# Patient Record
Sex: FEMALE | Race: WHITE | NOT HISPANIC OR LATINO | URBAN - METROPOLITAN AREA
[De-identification: names, ages, dates, MRNs, and addresses within clinical notes are randomized per-mention and may not be internally consistent; named-entity substitution may affect disease eponyms.]

---

## 2021-05-16 ENCOUNTER — EMERGENCY (EMERGENCY)
Facility: HOSPITAL | Age: 46
LOS: 1 days | Discharge: HOME | End: 2021-05-16
Attending: EMERGENCY MEDICINE | Admitting: EMERGENCY MEDICINE
Payer: COMMERCIAL

## 2021-05-16 VITALS
RESPIRATION RATE: 16 BRPM | DIASTOLIC BLOOD PRESSURE: 68 MMHG | HEART RATE: 67 BPM | SYSTOLIC BLOOD PRESSURE: 105 MMHG | OXYGEN SATURATION: 100 %

## 2021-05-16 VITALS
SYSTOLIC BLOOD PRESSURE: 109 MMHG | WEIGHT: 139.99 LBS | OXYGEN SATURATION: 100 % | HEART RATE: 77 BPM | HEIGHT: 65 IN | RESPIRATION RATE: 19 BRPM | TEMPERATURE: 98 F | DIASTOLIC BLOOD PRESSURE: 75 MMHG

## 2021-05-16 DIAGNOSIS — M25.511 PAIN IN RIGHT SHOULDER: ICD-10-CM

## 2021-05-16 PROCEDURE — 73030 X-RAY EXAM OF SHOULDER: CPT | Mod: 26

## 2021-05-16 PROCEDURE — 99284 EMERGENCY DEPT VISIT MOD MDM: CPT | Mod: 25

## 2021-05-16 PROCEDURE — 72125 CT NECK SPINE W/O DYE: CPT

## 2021-05-16 PROCEDURE — 72125 CT NECK SPINE W/O DYE: CPT | Mod: 26,MC

## 2021-05-16 PROCEDURE — 73030 X-RAY EXAM OF SHOULDER: CPT

## 2021-05-16 PROCEDURE — 96372 THER/PROPH/DIAG INJ SC/IM: CPT

## 2021-05-16 PROCEDURE — 73030 X-RAY EXAM OF SHOULDER: CPT | Mod: 26,RT

## 2021-05-16 RX ORDER — LIDOCAINE 4 G/100G
1 CREAM TOPICAL
Qty: 7 | Refills: 0
Start: 2021-05-16 | End: 2021-05-22

## 2021-05-16 RX ORDER — OXYCODONE AND ACETAMINOPHEN 5; 325 MG/1; MG/1
1 TABLET ORAL ONCE
Refills: 0 | Status: DISCONTINUED | OUTPATIENT
Start: 2021-05-16 | End: 2021-05-16

## 2021-05-16 RX ORDER — LIDOCAINE 4 G/100G
1 CREAM TOPICAL ONCE
Refills: 0 | Status: COMPLETED | OUTPATIENT
Start: 2021-05-16 | End: 2021-05-16

## 2021-05-16 RX ORDER — KETOROLAC TROMETHAMINE 30 MG/ML
30 SYRINGE (ML) INJECTION ONCE
Refills: 0 | Status: DISCONTINUED | OUTPATIENT
Start: 2021-05-16 | End: 2021-05-16

## 2021-05-16 RX ADMIN — OXYCODONE AND ACETAMINOPHEN 1 TABLET(S): 5; 325 TABLET ORAL at 02:37

## 2021-05-16 RX ADMIN — OXYCODONE AND ACETAMINOPHEN 1 TABLET(S): 5; 325 TABLET ORAL at 02:10

## 2021-05-16 RX ADMIN — LIDOCAINE 1 PATCH: 4 CREAM TOPICAL at 02:38

## 2021-05-16 RX ADMIN — Medication 30 MILLIGRAM(S): at 01:30

## 2021-05-16 NOTE — ED ADULT NURSE NOTE - OBJECTIVE STATEMENT
Pt c/o right shoulder pain which started today while getting dressed. Pt denies trauma to right shoulder.

## 2021-05-16 NOTE — ED PROVIDER NOTE - CARE PROVIDER_API CALL
Delon Graves)  Orthopedics  [address] [facility], [place] [address]  Phone: [phone]  Fax: [phone]  Follow Up Time:

## 2021-05-16 NOTE — ED PROVIDER NOTE - NSFOLLOWUPINSTRUCTIONS_ED_ALL_ED_FT
Shoulder Pain    WHAT YOU NEED TO KNOW:    What do I need to know about shoulder pain? Shoulder pain is a common problem that can affect your daily activities. Pain can be caused by a problem within your shoulder, such as soreness of a tendon or bursa. A tendon is a cord of tough tissue that connects your muscles to your bones. The bursa is a fluid-filled sac that acts as a cushion between a bone and a tendon. Shoulder pain may also be caused by pain that spreads to your shoulder from another part of your body.    Shoulder Anatomy         What increases my risk for shoulder pain?   •Repeated overhead activities, such as baseball, weight lifting, or swimming      •Medical conditions, such as rotator cuff disease, diabetes, or thyroid disorders      •A quick increase in the amount or intensity of exercises, or improper technique during exercise      •Muscle imbalance or weakness      •Trauma or a fall      •Age older than 60      How is shoulder pain diagnosed? Your healthcare provider will ask about your pain, including how and when it started. Tell him or her if you have any weakness or if there was an injury. He or she will examine your shoulder and do tests to see how well you can move your shoulder. You may also need any of the following tests:   •An x-ray, ultrasound, CT, or MRI may be needed to show the cause of your shoulder pain. You may be given contrast liquid to help the shoulder area show up better in the pictures. Tell the healthcare provider if you have ever had an allergic reaction to contrast liquid. Do not enter the MRI room with anything metal. Metal can cause serious injury. Tell the healthcare provider if you have any metal in or on your body.      •An electromyography test measures the electrical activity of your muscles at rest and with movement.      How is shoulder pain treated?   •Acetaminophen decreases pain and fever. It is available without a doctor's order. Ask how much to take and how often to take it. Follow directions. Read the labels of all other medicines you are using to see if they also contain acetaminophen, or ask your doctor or pharmacist. Acetaminophen can cause liver damage if not taken correctly. Do not use more than 4 grams (4,000 milligrams) total of acetaminophen in one day.       •NSAIDs, such as ibuprofen, help decrease swelling, pain, and fever. This medicine is available with or without a doctor's order. NSAIDs can cause stomach bleeding or kidney problems in certain people. If you take blood thinner medicine, always ask your healthcare provider if NSAIDs are safe for you. Always read the medicine label and follow directions.      •A steroid injection may help decrease pain and swelling.      •Surgery may be needed for long-term pain and loss of function.      How can I manage my symptoms?   •Apply ice on your shoulder for 20 to 30 minutes every 2 hours or as directed. Use an ice pack, or put crushed ice in a plastic bag. Cover it with a towel before you apply it to your shoulder. Ice helps prevent tissue damage and decreases swelling and pain.      •Apply heat if ice does not help your symptoms. Apply heat on your shoulder for 20 to 30 minutes every 2 hours for as many days as directed. Heat helps decrease pain and muscle spasms.      •Limit activities as directed. Try to avoid repeated overhead movements.      •Go to physical or occupational therapy as directed. A physical therapist teaches you exercises to help improve movement and strength, and to decrease pain. An occupational therapist teaches you skills to help with your daily activities.       How can I help prevent shoulder pain?   •Maintain a good range of motion in your shoulder. Ask your healthcare provider which exercises you should do on a regular basis after you have healed.       •Stretch and strengthen your shoulder. Use proper technique during exercises and sports.      When should I seek immediate care?   •You have severe pain.      •You cannot move your arm or shoulder.      •You have numbness or tingling in your shoulder or arm.      When should I contact my healthcare provider?   •Your pain gets worse or does not go away with treatment.      •You have trouble moving your arm or shoulder.      •You have questions or concerns about your condition or care.      CARE AGREEMENT:    You have the right to help plan your care. Learn about your health condition and how it may be treated. Discuss treatment options with your healthcare providers to decide what care you want to receive. You always have the right to refuse treatment.        © Copyright [employer] 2021         How to Use a Sling    AMBULATORY CARE:    A sling is a strong fabric loop that hangs from your neck to support your arm. Your arm, bent at the elbow, rests in the sling. Some slings have a strap that goes down your back to take the weight off your neck. This strap is connected to the elbow side of the sling. Your healthcare provider will help you decide which sling is best for you and where you can get one.     Shoulder Sling         Why you might need a sling: You may need a sling because of an injury or surgery to your hand, wrist, arm, or shoulder. A sling helps prevent your hand, arm, and shoulder from moving so your injury can heal. A sling can also help if you have a heavy cast on your arm.    Call your doctor or orthopedist if:   •Your arm, hand, or fingers become swollen, numb, painful, or pale.      •You have questions about how to use the sling.      How to use a sling: Your healthcare provider will teach you how to put on your sling. The following are general guidelines to help you remember what your provider teaches you:   •Gently bend your injured arm and hold it in front of you, across your body. Your thumb should be pointing up.      •Put the strap around your neck. The strap usually has an adhesive fabric to make it easy for you to fasten.      •Put your arm in the sling so your elbow is in the closed end. Your hand will be at the open end.      •Put the edge of the sling so it covers the first fold of the little finger.      •Wiggle your fingers as directed to prevent hand stiffness.      Follow up with your doctor or orthopedist as directed: You may need tests to check how your arm is healing. You may need to have the sling adjusted, or change to a different kind of sling. If your injury has healed, you may not need to keep using the sling. Write down your questions so you remember to ask them during your visits.       © Copyright [employer] 2021

## 2021-05-16 NOTE — ED PROVIDER NOTE - CLINICAL SUMMARY MEDICAL DECISION MAKING FREE TEXT BOX
Pt with right shoulder pain, cervical strain. Will check shoulder xray, CT cervical spine. Pt given toradol IM in ED. Pt declined percocet at this time.   Re-assess.

## 2021-05-16 NOTE — ED ADULT TRIAGE NOTE - OTHER COMPLAINTS
CC of shoulder pain R side. Hx of pinch nerve in the past. Pt is trying to get dressed in the car and felt a pop on her R shoulder and unable to move it

## 2021-05-16 NOTE — ED ADULT NURSE NOTE - NSIMPLEMENTINTERV_GEN_ALL_ED
Implemented All Universal Safety Interventions:  [place] to call system. Call bell, personal items and telephone within reach. Instruct patient to call for assistance. Room bathroom lighting operational. Non-slip footwear when patient is off stretcher. Physically safe environment: no spills, clutter or unnecessary equipment. Stretcher in lowest position, wheels locked, appropriate side rails in place.

## 2021-05-16 NOTE — ED PROVIDER NOTE - OBJECTIVE STATEMENT
Pt is a 45F who presents to ED for right shoulder pain beginning 4 hours prior to ED arrival. Pt states she was in her car, changed her outfit prior to dinner when she felt a "pop" in her right shoulder. Pt states she has been having persistent pain Pt is a 45F who presents to ED for right shoulder pain beginning 4 hours prior to ED arrival. Pt states she was in her car, changed her outfit prior to dinner when she felt a "pop" in her right shoulder. Pt states she has been having persistent pain and is unable to lift her shoulder above her head. no numbness or tingling. Pt took tylenol 2 hours prior to ED arrival with no improvement of pain.

## 2021-05-16 NOTE — ED PROVIDER NOTE - MUSCULOSKELETAL NECK EXAM
right paracervical tenderness to palpation/no deformity, pain or tenderness. no restriction of movement

## 2021-05-16 NOTE — ED PROVIDER NOTE - PATIENT PORTAL LINK FT
You can access the FollowMyHealth Patient Portal offered by [facility] by registering at the following website: [email or web address] By joining [facility]’s FollowMyHealth portal, you will also be able to view your health information using other applications (apps) compatible with our system.